# Patient Record
Sex: FEMALE | URBAN - METROPOLITAN AREA
[De-identification: names, ages, dates, MRNs, and addresses within clinical notes are randomized per-mention and may not be internally consistent; named-entity substitution may affect disease eponyms.]

---

## 2023-01-16 ENCOUNTER — OFFICE VISIT (OUTPATIENT)
Dept: INTERNAL MEDICINE CLINIC | Age: 28
End: 2023-01-16

## 2023-01-16 VITALS
RESPIRATION RATE: 18 BRPM | WEIGHT: 157.2 LBS | HEIGHT: 68 IN | SYSTOLIC BLOOD PRESSURE: 96 MMHG | HEART RATE: 58 BPM | BODY MASS INDEX: 23.82 KG/M2 | OXYGEN SATURATION: 99 % | TEMPERATURE: 98.6 F | DIASTOLIC BLOOD PRESSURE: 63 MMHG

## 2023-01-16 DIAGNOSIS — Z23 ENCOUNTER FOR IMMUNIZATION: ICD-10-CM

## 2023-01-16 DIAGNOSIS — Z23 NEEDS FLU SHOT: ICD-10-CM

## 2023-01-16 DIAGNOSIS — K59.00 CONSTIPATION, UNSPECIFIED CONSTIPATION TYPE: Primary | ICD-10-CM

## 2023-01-16 DIAGNOSIS — R87.612 LGSIL ON PAP SMEAR OF CERVIX: ICD-10-CM

## 2023-01-16 PROCEDURE — 90686 IIV4 VACC NO PRSV 0.5 ML IM: CPT | Performed by: STUDENT IN AN ORGANIZED HEALTH CARE EDUCATION/TRAINING PROGRAM

## 2023-01-16 PROCEDURE — 99203 OFFICE O/P NEW LOW 30 MIN: CPT | Performed by: STUDENT IN AN ORGANIZED HEALTH CARE EDUCATION/TRAINING PROGRAM

## 2023-01-16 PROCEDURE — 90471 IMMUNIZATION ADMIN: CPT | Performed by: STUDENT IN AN ORGANIZED HEALTH CARE EDUCATION/TRAINING PROGRAM

## 2023-01-16 RX ORDER — POLYETHYLENE GLYCOL 3350 17 G/17G
17 POWDER, FOR SOLUTION ORAL DAILY
Qty: 30 EACH | Refills: 4 | Status: SHIPPED | OUTPATIENT
Start: 2023-01-16

## 2023-01-16 NOTE — PATIENT INSTRUCTIONS
Vaccine Information Statement    Influenza (Flu) Vaccine (Inactivated or Recombinant): What You Need to Know    Many vaccine information statements are available in [place] and other languages. See www.immunize.org/vis. Hojas de información sobre vacunas están disponibles en español y en muchos otros idiomas. Visite www.immunize.org/vis. 1. Why get vaccinated? Influenza vaccine can prevent influenza (flu). Flu is a contagious disease that spreads around the [facility] every year, usually between October and May. Anyone can get the flu, but it is more dangerous for some people. Infants and young children, people 72 years and older, pregnant people, and people with certain health conditions or a weakened immune system are at greatest risk of flu complications. Pneumonia, bronchitis, sinus infections, and ear infections are examples of flu-related complications. If you have a medical condition, such as heart disease, cancer, or diabetes, flu can make it worse. Flu can cause fever and chills, sore throat, muscle aches, fatigue, cough, headache, and runny or stuffy nose. Some people may have vomiting and diarrhea, though this is more common in children than adults. In an average year, thousands of people in the [facility] die from flu, and many more are hospitalized. Flu vaccine prevents millions of illnesses and flu-related visits to the doctor each year. 2. Influenza vaccines     CDC recommends everyone 6 months and older get vaccinated every flu season. Children 6 months through 6years of age may need 2 doses during a single flu season. Everyone else needs only 1 dose each flu season. It takes about 2 weeks for protection to develop after vaccination. There are many flu viruses, and they are always changing. Each year a new flu vaccine is made to protect against the influenza viruses believed to be likely to cause disease in the upcoming flu season.  Even when the vaccine doesnt exactly match these viruses, it may still provide some protection. Influenza vaccine does not cause flu. Influenza vaccine may be given at the same time as other vaccines. 3. Talk with your health care provider    Tell your vaccination provider if the person getting the vaccine:  Has had an allergic reaction after a previous dose of influenza vaccine, or has any severe, life-threatening allergies   Has ever had Guillain-Barré Syndrome (also called GBS)    In some cases, your health care provider may decide to postpone influenza vaccination until a future visit. Influenza vaccine can be administered at any time during pregnancy. People who are or will be pregnant during influenza season should receive inactivated influenza vaccine. People with minor illnesses, such as a cold, may be vaccinated. People who are moderately or severely ill should usually wait until they recover before getting influenza vaccine. Your health care provider can give you more information. 4. Risks of a vaccine reaction    Soreness, redness, and swelling where the shot is given, fever, muscle aches, and headache can happen after influenza vaccination. There may be a very small increased risk of Guillain-Barré Syndrome (GBS) after inactivated influenza vaccine (the flu shot). Floresneena Day children who get the flu shot along with pneumococcal vaccine (PCV13) and/or DTaP vaccine at the same time might be slightly more likely to have a seizure caused by fever. Tell your health care provider if a child who is getting flu vaccine has ever had a seizure. People sometimes faint after medical procedures, including vaccination. Tell your provider if you feel dizzy or have vision changes or ringing in the ears. As with any medicine, there is a very remote chance of a vaccine causing a severe allergic reaction, other serious injury, or death. 5. What if there is a serious problem?     An allergic reaction could occur after the vaccinated person leaves the clinic. If you see signs of a severe allergic reaction (hives, swelling of the face and throat, difficulty breathing, a fast heartbeat, dizziness, or weakness), call 9-1-1 and get the person to the nearest hospital.    For other signs that concern you, call your health care provider. Adverse reactions should be reported to the Vaccine Adverse Event Reporting System (VAERS). Your health care provider will usually file this report, or you can do it yourself. Visit the VAERS website at www.vaers. [facility].gov or call 6-681.525.7030. VAERS is only for reporting reactions, and VAERS staff members do not give medical advice. 6. The National Vaccine Injury Compensation Program    The [facility] Vaccine Injury Compensation Program (VICP) is a federal program that was created to compensate people who may have been injured by certain vaccines. Claims regarding alleged injury or death due to vaccination have a time limit for filing, which may be as short as two years. Visit the VICP website at www.[facility].gov/vaccinecompensation or call 7-223.816.1668 to learn about the program and about filing a claim. 7. How can I learn more? Ask your health care provider. Call your local or state health department. Visit the website of the Food and Drug Administration (FDA) for vaccine package inserts and additional information at www.fda.gov/vaccines-blood-biologics/vaccines. Contact the Centers for Disease Control and Prevention (CDC): Call 6-504.571.8103 (1-800-CDC-INFO) or  Visit CDCs influenza website at www.cdc.gov/flu. Vaccine Information Statement   Inactivated Influenza Vaccine   8/6/2021  42 VIRGIE Jaime 402RT-[id number]   Department of Health and Human Services  Centers for Disease Control and Prevention    Office Use Only

## 2023-01-16 NOTE — PROGRESS NOTES
[facility] in [facility]   Internal Medicine  [address], [address]  [address]  [place], [address]  [phone]      Primary Care Visit Note    Assessment/Plan:     Diagnoses and all orders for this visit:    1. Constipation, unspecified constipation type  -     polyethylene glycol (MIRALAX) 17 gram packet; Take 1 Packet by mouth daily. 2. LGSIL on Pap smear of cervix  -     REFERRAL TO OBSTETRICS AND GYNECOLOGY  - per pt no concerning findings,     HM:  - pt current on mirena IUD. Follow up: Follow-up and Dispositions    Return in about 1 year (around 1/16/2024). Mala Veronica MD    CC:     Chief Complaint   Patient presents with    [facility]       HPI:     Griselda Turner is a 33 y.o. female who presents to [facility]. Pt feels healthy and does not have any acute complaints today other than constipation. Pt's mother had MI at about age 59, she does smoke and does not eat healthy. Pt had a pap test about 2 year ago at which time there were some abnormal cells but per pt \"nothing concerning. \"    Constipation:  - having about 3 bms per week. - has tried staying hydrated and meta mucil without improvement. - she has not tried miralax.          ROS:   Constitutional: negative for fevers, chills, anorexia and weight loss  Eyes:   negative for visual disturbance and irritation  ENT:   negative for tinnitus,sore throat,nasal congestion,ear pain,hoarseness  Respiratory:  negative for cough, hemoptysis, dyspnea,wheezing  CV:   negative for chest pain, palpitations, lower extremity edema  GI:   negative for nausea, vomiting, diarrhea, abdominal pain,melena  Genitourinary: negative for frequency, dysuria and hematuria  Musculoskel: negative for myalgias, arthralgias, back pain, muscle weakness, joint pain  Neurological:  negative for headaches, dizziness, focal weakness, numbness  Psychiatric:     Negative for depression or anxiety        Past Medical History: Active Ambulatory Problems     Diagnosis Date Noted    LGSIL on Pap smear of cervix 01/16/2023    Constipation, unspecified constipation type 01/16/2023     Resolved Ambulatory Problems     Diagnosis Date Noted    No Resolved Ambulatory Problems     No Additional Past Medical History          Current Medications:     Current Outpatient Medications:     polyethylene glycol (MIRALAX) 17 gram packet, Take 1 Packet by mouth daily. , Disp: 30 Each, Rfl: 4      Past Surgical History:   No past surgical history on file. Family History:   No family history on file.       Social History:     Social History     Socioeconomic History    Marital status: SINGLE     Spouse name: Not on file    Number of children: Not on file    Years of education: Not on file    Highest education level: Not on file   Occupational History    Not on file   Tobacco Use    Smoking status: Never    Smokeless tobacco: Never   Substance and Sexual Activity    Alcohol use: Yes     Comment: socially, rarely    Drug use: Never    Sexual activity: Not on file   Other Topics Concern    Not on file   Social History Narrative    Not on file     Social Determinants of Health     Financial Resource Strain: Low Risk     Difficulty of Paying Living Expenses: Not very hard   Food Insecurity: No Food Insecurity    Worried About Running Out of Food in the Last Year: Never true    Ran Out of Food in the Last Year: Never true   Transportation Needs: Not on file   Physical Activity: Not on file   Stress: Not on file   Social Connections: Not on file   Intimate Partner Violence: Not on file   Housing Stability: Not on file            Visit Vitals  BP 96/63 (BP 1 Location: Left upper arm, BP Patient Position: Sitting, BP Cuff Size: Large adult)   Pulse (!) 58   Temp 98.6 °F (37 °C) (Temporal)   Resp 18   Ht 5' 8\" (1.727 m)   Wt 157 lb 3.2 oz (71.3 kg)   LMP 01/10/2022 Comment: IUD   SpO2 99%   BMI 23.90 kg/m²       Physical Exam:   General - Well appearing female  HEENT - PERRL, TM no erythema/opacification, normal nasal turbinates, no oropharyngeal erythema or exudate, MMM  Neck - supple, no thyroidomegaly, no lymphadenopathy  Pulm - clear to auscultation bilaterally  Cardio - RRR, normal S1 S2, no murmur  Abd - soft, nontender, no masses, no HSM  Extrem - no edema, +2 distal pulses  Neuro-  Alert and oriented, No focal deficits           Labs/Imaging:     Labs and imaging reviewed by me and significant for:    Labs from [facility]:     Ref Range & Units 8/13/21 1351   TSH 0.27 - 4.20 mcU/mL 1.06       Ref Range & Units 8/13/21 1351   Cholesterol 110 - 200 mg/dL 151    Triglyceride 40 - 149 mg/dL 62    HDL >=40 mg/dL 56    Cholesterol/HDL 0.0 - 5.0 2.7    Non-HDL Cholesterol <130 mg/dL 95    LDL CALCULATION 50 - 99 mg/dL 83    VLDL CALCULATION 8 - 30 mg/dL 12    LDL/HDL Ratio  1.5         Ref Range & Units 8/13/21 1351   Potassium 3.5 - 5.5 mmol/L 4.1    Sodium 133 - 145 mmol/L 141    Chloride 98 - 110 mmol/L 104    Glucose 70 - 99 mg/dL 87    Calcium 8.4 - 10.5 mg/dL 9.7    Albumin 3.5 - 5.0 g/dL 4.5    SGPT (ALT) 5 - 40 U/L 6    SGOT (AST) 10 - 37 U/L 14    Bilirubin Total 0.2 - 1.2 mg/dL 0.6    Alkaline Phosphatase 25 - 115 U/L 67    BUN 6 - 22 mg/dL 16    CO2 20 - 32 mmol/L 27    Creatinine 0.5 - 1.2 mg/dL 0.8    eGFR African American >60.0 >60.0    eGFR Non African American >60.0 >60.0    Globulin 2.0 - 4.0 g/dL 2.5    A/G Ratio 1.1 - 2.6 ratio 1.8    Total Protein 6.4 - 8.3 g/dL 7.0    Anion Gap 3.0 - 15.0 mmol/L 10.0       Ref Range & Units 8/13/21 1351   WBC 4.0 - 11.0 K/uL 5.9    RBC 3.80 - 5.20 M/uL 3.96    HGB 11.7 - 15.5 g/dL 12.6    HCT 35.1 - 46.5 % 37.3    MCV 81 - 99 fL 94    MCH 26 - 34 pg 32    MCHC 31 - 36 g/dL 34    RDW 10.0 - 15.5 % 12.2    Platelet 251 - 041 K/uL 176    MPV 9.0 - 13.0 fL 13.2 High     Immature Platelet Fraction 1.1 - 7.1 % 12.2 High     Segmented Neutrophils (Auto) 40 - 75 % 62    Lymphocytes (Auto) 20 - 45 % 28    Monocytes (Auto) 3 - 12 % 6    Eosinophils (Auto) 0 - 6 % 3    Basophils (Auto) 0 - 2 % 1    Absolute Neutrophils (Auto) 1.8 - 7.7 K/uL 3.7    Absolute Lymphocytes (Auto) 1.0 - 4.8 K/uL 1.7    Absolute Monocytes (Auto) 0.1 - 1.0 K/uL 0.4    Absolute Eosinophils (Auto) 0.0 - 0.5 K/uL 0.2    Absolute Basophils (Auto) 0.0 - 0.2 K/uL 0.0

## 2023-01-16 NOTE — PROGRESS NOTES
Chief Complaint   Patient presents with    Establish Care          1. \"Have you been to the ER, urgent care clinic since your last visit? Hospitalized since your last visit?no    2. \"Have you seen or consulted any other health care providers outside of the [address] [place] [address] since your last visit? No     3. For patients aged 39-70: Has the patient had a colonoscopy / FIT/ Cologuard? NA - based on age      If the patient is female:    4. For patients aged 41-77: Has the patient had a mammogram within the past 2 years? NA - based on age or sex      11. For patients aged 21-65: Has the patient had a pap smear?  Yes - no Care Gap present

## 2025-01-02 ENCOUNTER — TELEPHONE (OUTPATIENT)
Age: 30
End: 2025-01-02

## 2025-01-02 NOTE — TELEPHONE ENCOUNTER
----- Message from Caryl ROBERTS sent at 2025  2:52 PM EST -----  [phone]  [facility] Appointment Request    Patient needs appointment for [facility] Appointment Type: Annual Visit.    Patient Requested Dates(s):ANYTIME ON    Patient Requested Time:MORNING  Provider Name:   Tom Moore MD        Reason for Appointment Request: New Patient - Available appointments did not meet patient need  --------------------------------------------------------------------------------------------------------------------------    Relationship to Patient: Self     Call Back Information: OK to leave message on voicemail  Preferred Call Back Number: Phone [phone]